# Patient Record
Sex: MALE | Race: BLACK OR AFRICAN AMERICAN | NOT HISPANIC OR LATINO | Employment: FULL TIME | URBAN - METROPOLITAN AREA
[De-identification: names, ages, dates, MRNs, and addresses within clinical notes are randomized per-mention and may not be internally consistent; named-entity substitution may affect disease eponyms.]

---

## 2017-04-19 ENCOUNTER — OFFICE VISIT (OUTPATIENT)
Dept: SURGERY | Facility: CLINIC | Age: 22
End: 2017-04-19
Payer: COMMERCIAL

## 2017-04-19 VITALS — SYSTOLIC BLOOD PRESSURE: 150 MMHG | DIASTOLIC BLOOD PRESSURE: 85 MMHG | HEART RATE: 112 BPM | WEIGHT: 199.75 LBS

## 2017-04-19 DIAGNOSIS — K60.2 ANAL FISSURE: Primary | ICD-10-CM

## 2017-04-19 PROCEDURE — 99204 OFFICE O/P NEW MOD 45 MIN: CPT | Mod: S$GLB,,, | Performed by: COLON & RECTAL SURGERY

## 2017-04-19 PROCEDURE — 1160F RVW MEDS BY RX/DR IN RCRD: CPT | Mod: S$GLB,,, | Performed by: COLON & RECTAL SURGERY

## 2017-04-19 PROCEDURE — 99999 PR PBB SHADOW E&M-NEW PATIENT-LVL III: CPT | Mod: PBBFAC,,, | Performed by: COLON & RECTAL SURGERY

## 2017-04-19 RX ORDER — SODIUM CHLORIDE 9 MG/ML
INJECTION, SOLUTION INTRAVENOUS CONTINUOUS
Status: CANCELLED | OUTPATIENT
Start: 2017-04-19

## 2017-04-19 NOTE — H&P
Latonya Joseph is a 22 y.o. male     Chief Complaint: Anal pain       HPI: Had fissure surgery followed by a hemorrhoidectomy by Dr Franklin in Jan 2017. Since then has had posterior anal pain with BMs. Tried creams and fiber without releaf.    ROS:        Constitutional: No fever, chills, activity or appetite change.      HENT: No hearing loss, facial swelling, neck pain or stiffness.       Eyes: No discharge, itching and visual disturbance.      Respiratory: No apnea, cough, choking or shortness of breath.       Cardiovascular: No leg swelling or chest pain      Gastrointestinal: No abdominal distention or change in bowel habbits     Genitourinary: No dysuria, frequency or flank pain.      Musculoskeletal: No arthralgias or gait problem.      Neurological: No dizziness, seizures or weakness.      Hematological: No adenopathy.      Psychiatric/Behavioral: No hallucinations or behavioral problems.       PE:    APPEARANCE: Well nourished, well developed, in no acute distress.   CHEST: Lungs clear. Normal respiratory effort.  CARDIOVASCULAR: Normal rhythm. No edema.  ABDOMEN: Soft. No tenderness or masses.  Rectum:  Posterior keyhole deformity and fissure, NST no masses, tender  Musculoskeletal: Symmetric, normal range of motion and strength.   Neurological: Alert and oriented to person, place, and time. Normal reflexes.   Skin: Skin is warm and dry.   Psychiatric: Normal mood and affect. Behavior is normal and appropriate.     Impression: Posterior anal fissure  PLAN: discussed options will do EUA,  anal advancement flap.  I have explained the procedure including indications, alternatives, expected outcomes and potential complications. The patient appears to understand and gives informed consent. The patient is medically ready for surgery.

## 2017-04-19 NOTE — PROGRESS NOTES
Patient ID:  Latonya Joseph is a 22 y.o. male     Chief Complaint: Anal pain       HPI: Had fissure surgery followed by a hemorrhoidectomy by Dr Franklin in Jan 2017. Since then has had posterior anal pain with BMs. Tried creams and fiber without releaf.    ROS:        Constitutional: No fever, chills, activity or appetite change.      HENT: No hearing loss, facial swelling, neck pain or stiffness.       Eyes: No discharge, itching and visual disturbance.      Respiratory: No apnea, cough, choking or shortness of breath.       Cardiovascular: No leg swelling or chest pain      Gastrointestinal: No abdominal distention or change in bowel habbits     Genitourinary: No dysuria, frequency or flank pain.      Musculoskeletal: No arthralgias or gait problem.      Neurological: No dizziness, seizures or weakness.      Hematological: No adenopathy.      Psychiatric/Behavioral: No hallucinations or behavioral problems.       PE:    APPEARANCE: Well nourished, well developed, in no acute distress.   CHEST: Lungs clear. Normal respiratory effort.  CARDIOVASCULAR: Normal rhythm. No edema.  ABDOMEN: Soft. No tenderness or masses.  Rectum:  Posterior keyhole deformity and fissure, NST no masses, tender  Musculoskeletal: Symmetric, normal range of motion and strength.   Neurological: Alert and oriented to person, place, and time. Normal reflexes.   Skin: Skin is warm and dry.   Psychiatric: Normal mood and affect. Behavior is normal and appropriate.     Impression: Posterior anal fissure  PLAN: discussed options will do EUA,  anal advancement flap.  I have explained the procedure including indications, alternatives, expected outcomes and potential complications. The patient appears to understand and gives informed consent. The patient is medically ready for surgery.

## 2017-04-21 ENCOUNTER — HOSPITAL ENCOUNTER (OUTPATIENT)
Facility: HOSPITAL | Age: 22
Discharge: HOME | End: 2017-04-21
Attending: COLON & RECTAL SURGERY | Admitting: COLON & RECTAL SURGERY
Payer: COMMERCIAL

## 2017-04-21 ENCOUNTER — ANESTHESIA (OUTPATIENT)
Dept: SURGERY | Facility: HOSPITAL | Age: 22
End: 2017-04-21
Payer: COMMERCIAL

## 2017-04-21 ENCOUNTER — ANESTHESIA EVENT (OUTPATIENT)
Dept: SURGERY | Facility: HOSPITAL | Age: 22
End: 2017-04-21
Payer: COMMERCIAL

## 2017-04-21 VITALS
TEMPERATURE: 98 F | BODY MASS INDEX: 29.47 KG/M2 | RESPIRATION RATE: 20 BRPM | WEIGHT: 199 LBS | OXYGEN SATURATION: 99 % | HEART RATE: 85 BPM | DIASTOLIC BLOOD PRESSURE: 75 MMHG | HEIGHT: 69 IN | SYSTOLIC BLOOD PRESSURE: 138 MMHG

## 2017-04-21 DIAGNOSIS — K60.2 ANAL FISSURE: ICD-10-CM

## 2017-04-21 PROCEDURE — 25000003 PHARM REV CODE 250: Performed by: SURGERY

## 2017-04-21 PROCEDURE — 63600175 PHARM REV CODE 636 W HCPCS: Performed by: COLON & RECTAL SURGERY

## 2017-04-21 PROCEDURE — 46288 REPAIR ANAL FISTULA: CPT | Mod: ,,, | Performed by: COLON & RECTAL SURGERY

## 2017-04-21 PROCEDURE — 25000003 PHARM REV CODE 250: Performed by: NURSE ANESTHETIST, CERTIFIED REGISTERED

## 2017-04-21 PROCEDURE — 25000003 PHARM REV CODE 250: Performed by: COLON & RECTAL SURGERY

## 2017-04-21 PROCEDURE — D9220A PRA ANESTHESIA: Mod: CRNA,,, | Performed by: NURSE ANESTHETIST, CERTIFIED REGISTERED

## 2017-04-21 PROCEDURE — 71000033 HC RECOVERY, INTIAL HOUR: Performed by: COLON & RECTAL SURGERY

## 2017-04-21 PROCEDURE — 36000708 HC OR TIME LEV III 1ST 15 MIN: Performed by: COLON & RECTAL SURGERY

## 2017-04-21 PROCEDURE — 71000015 HC POSTOP RECOV 1ST HR: Performed by: COLON & RECTAL SURGERY

## 2017-04-21 PROCEDURE — 63600175 PHARM REV CODE 636 W HCPCS: Performed by: NURSE ANESTHETIST, CERTIFIED REGISTERED

## 2017-04-21 PROCEDURE — 37000008 HC ANESTHESIA 1ST 15 MINUTES: Performed by: COLON & RECTAL SURGERY

## 2017-04-21 PROCEDURE — 36000709 HC OR TIME LEV III EA ADD 15 MIN: Performed by: COLON & RECTAL SURGERY

## 2017-04-21 PROCEDURE — D9220A PRA ANESTHESIA: Mod: ANES,,, | Performed by: ANESTHESIOLOGY

## 2017-04-21 PROCEDURE — 27000221 HC OXYGEN, UP TO 24 HOURS

## 2017-04-21 PROCEDURE — 37000009 HC ANESTHESIA EA ADD 15 MINS: Performed by: COLON & RECTAL SURGERY

## 2017-04-21 RX ORDER — SUCCINYLCHOLINE CHLORIDE 20 MG/ML
INJECTION INTRAMUSCULAR; INTRAVENOUS
Status: DISCONTINUED | OUTPATIENT
Start: 2017-04-21 | End: 2017-04-21

## 2017-04-21 RX ORDER — SODIUM CHLORIDE 9 MG/ML
INJECTION, SOLUTION INTRAVENOUS CONTINUOUS
Status: DISCONTINUED | OUTPATIENT
Start: 2017-04-21 | End: 2017-04-21 | Stop reason: HOSPADM

## 2017-04-21 RX ORDER — HYDROCODONE BITARTRATE AND ACETAMINOPHEN 5; 325 MG/1; MG/1
1 TABLET ORAL EVERY 4 HOURS PRN
Status: DISCONTINUED | OUTPATIENT
Start: 2017-04-21 | End: 2017-04-21 | Stop reason: HOSPADM

## 2017-04-21 RX ORDER — BUPIVACAINE HYDROCHLORIDE AND EPINEPHRINE 2.5; 5 MG/ML; UG/ML
INJECTION, SOLUTION EPIDURAL; INFILTRATION; INTRACAUDAL; PERINEURAL
Status: DISCONTINUED | OUTPATIENT
Start: 2017-04-21 | End: 2017-04-21 | Stop reason: HOSPADM

## 2017-04-21 RX ORDER — FENTANYL CITRATE 50 UG/ML
INJECTION, SOLUTION INTRAMUSCULAR; INTRAVENOUS
Status: DISCONTINUED | OUTPATIENT
Start: 2017-04-21 | End: 2017-04-21

## 2017-04-21 RX ORDER — IBUPROFEN 200 MG
600 TABLET ORAL EVERY 6 HOURS PRN
Status: DISCONTINUED | OUTPATIENT
Start: 2017-04-21 | End: 2017-04-21 | Stop reason: HOSPADM

## 2017-04-21 RX ORDER — ONDANSETRON 2 MG/ML
INJECTION INTRAMUSCULAR; INTRAVENOUS
Status: DISCONTINUED | OUTPATIENT
Start: 2017-04-21 | End: 2017-04-21

## 2017-04-21 RX ORDER — OXYCODONE HYDROCHLORIDE 15 MG/1
15 TABLET ORAL EVERY 4 HOURS PRN
Qty: 30 TABLET | Refills: 0 | Status: SHIPPED | OUTPATIENT
Start: 2017-04-21 | End: 2018-01-19

## 2017-04-21 RX ORDER — PROPOFOL 10 MG/ML
VIAL (ML) INTRAVENOUS
Status: DISCONTINUED | OUTPATIENT
Start: 2017-04-21 | End: 2017-04-21

## 2017-04-21 RX ORDER — IBUPROFEN 800 MG/1
800 TABLET ORAL EVERY 8 HOURS PRN
Qty: 30 TABLET | Refills: 0 | Status: SHIPPED | OUTPATIENT
Start: 2017-04-21 | End: 2018-01-31

## 2017-04-21 RX ORDER — LIDOCAINE HCL/PF 100 MG/5ML
SYRINGE (ML) INTRAVENOUS
Status: DISCONTINUED | OUTPATIENT
Start: 2017-04-21 | End: 2017-04-21

## 2017-04-21 RX ORDER — MIDAZOLAM HYDROCHLORIDE 1 MG/ML
INJECTION, SOLUTION INTRAMUSCULAR; INTRAVENOUS
Status: DISCONTINUED | OUTPATIENT
Start: 2017-04-21 | End: 2017-04-21

## 2017-04-21 RX ORDER — CEFOXITIN SODIUM 2 G/50ML
2 INJECTION, SOLUTION INTRAVENOUS
Status: COMPLETED | OUTPATIENT
Start: 2017-04-21 | End: 2017-04-21

## 2017-04-21 RX ADMIN — SUCCINYLCHOLINE CHLORIDE 100 MG: 20 INJECTION, SOLUTION INTRAMUSCULAR; INTRAVENOUS at 07:04

## 2017-04-21 RX ADMIN — ONDANSETRON 4 MG: 2 INJECTION INTRAMUSCULAR; INTRAVENOUS at 07:04

## 2017-04-21 RX ADMIN — SODIUM CHLORIDE: 0.9 INJECTION, SOLUTION INTRAVENOUS at 06:04

## 2017-04-21 RX ADMIN — MIDAZOLAM HYDROCHLORIDE 2 MG: 1 INJECTION, SOLUTION INTRAMUSCULAR; INTRAVENOUS at 06:04

## 2017-04-21 RX ADMIN — LIDOCAINE HYDROCHLORIDE 40 MG: 20 INJECTION, SOLUTION INTRAVENOUS at 07:04

## 2017-04-21 RX ADMIN — IBUPROFEN 800 MG: 800 INJECTION INTRAVENOUS at 06:04

## 2017-04-21 RX ADMIN — HYDROCODONE BITARTRATE AND ACETAMINOPHEN 1 TABLET: 5; 325 TABLET ORAL at 08:04

## 2017-04-21 RX ADMIN — CEFOXITIN SODIUM 2 G: 2 INJECTION, SOLUTION INTRAVENOUS at 07:04

## 2017-04-21 RX ADMIN — FENTANYL CITRATE 100 MCG: 50 INJECTION, SOLUTION INTRAMUSCULAR; INTRAVENOUS at 07:04

## 2017-04-21 RX ADMIN — PROPOFOL 180 MG: 10 INJECTION, EMULSION INTRAVENOUS at 07:04

## 2017-04-21 NOTE — ANESTHESIA POSTPROCEDURE EVALUATION
"Anesthesia Post Evaluation    Patient: Latonya Signrios    Procedure(s) Performed: Procedure(s) (LRB):  ADVANCEMENT-FLAP-HOUSE, exam under anesthesia (N/A)    Final Anesthesia Type: general  Patient location during evaluation: PACU  Patient participation: Yes- Able to Participate  Level of consciousness: awake and alert  Post-procedure vital signs: reviewed and stable  Pain management: adequate  Airway patency: patent  PONV status at discharge: No PONV  Anesthetic complications: no      Cardiovascular status: blood pressure returned to baseline  Respiratory status: unassisted  Hydration status: euvolemic  Follow-up not needed.        Visit Vitals    /73    Pulse 97    Temp 35.9 °C (96.6 °F) (Axillary)    Resp 19    Ht 5' 9" (1.753 m)    Wt 90.3 kg (199 lb)    SpO2 100%    BMI 29.39 kg/m2       Pain/Ronny Score: Pain Assessment Performed: Yes (4/21/2017  8:00 AM)  Presence of Pain: denies (4/21/2017  8:00 AM)  Pain Rating Prior to Med Admin: 4 (4/21/2017  8:12 AM)      "

## 2017-04-21 NOTE — OP NOTE
[facility]# [id number]    Jose Sagastume MD  Colon and Rectal Surgery Fellow  [phone]

## 2017-04-21 NOTE — BRIEF OP NOTE
[facility]  Colon and Rectal Surgery  Operative Note    SUMMARY     Date of Procedure: 4/21/2017     Procedure: Procedure(s) (LRB):  ADVANCEMENT-FLAP-HOUSE, exam under anesthesia (N/A)     Surgeon(s) and Role:     * Jonathan Pepper MD - Primary     * Jose Sagastume MD - Fellow    Assisting Surgeon: None    Pre-Operative Diagnosis: Anal fissure [K60.2]    Post-Operative Diagnosis: Post-Op Diagnosis Codes:     * Anal fissure [K60.2], stenosis    Anesthesia: General, marcaine w epi    Technical Procedures Used: posterior House advancement flap  Description of the Findings of the Procedure: post fissure and stenosis    Significant Surgical Tasks Conducted by the Assistant(s), if Applicable: n/a  Complications: No    Estimated Blood Loss (EBL): * No values recorded between 4/21/2017  7:13 AM and 4/21/2017  7:46 AM *           Implants: * No implants in log *    Specimens:   Specimen     None           Condition: Good    Disposition: PACU - hemodynamically stable.    Attestation: I was present and scrubbed for the entire procedure.

## 2017-04-21 NOTE — OP NOTE
DATE OF PROCEDURE:  04/21/2017.    PROCEDURES PERFORMED:  Advancement house flap and an exam under anesthesia.    ATTENDING SURGEON ON RECORD:  Jonathan Pepper M.D.    FELLOW ASSISTING:  Jose Sagastume M.D. (RES).    PREOPERATIVE DIAGNOSIS:  Anal fissure.    POSTOPERATIVE DIAGNOSES:  Anal fissure and anal stenosis.    ANESTHESIA:  The patient received general anesthesia and Marcaine with   epinephrine.    TECHNICAL PROCEDURE PERFORMED:  Posterior house advancement flap.    OPERATIVE FINDINGS:  Posterior midline fissure and mild anal stenosis.    ESTIMATED BLOOD LOSS:  Minimal.    SPECIMENS:  There were no specimens sent to the pathologist.    COMPLICATIONS:  No interoperative complications.    INDICATIONS FOR THE PROCEDURE:  This is a 22-year-old male who had previous   fissure surgeries followed by hemorrhoidectomies in January of this year.  He   continues to have posterior anal pain with bowel movements.  He had tried   multiple nonoperative interventions and desired operative intervention for   relief.    DESCRIPTION OF PROCEDURE:  The patient was met in the preoperative holding area   where his consents and site verification forms were reviewed.  Questions were   answered.  Risks and benefits of the procedure were explained to include   bleeding, infection, damage to surrounding structures, need for repeat   procedures, possible incontinence and risks of anesthesia.  He agreed and wished   to proceed.  He was taken to the Operating Room, placed on the operating room   table in the left lateral position.  After general anesthesia had been induced   and his perineum was prepped and draped in the usual sterile fashion, an   operative timeout was performed and the procedure began.  We initially began by   making an incision in the shape of a house extending radially and just off of   the posterior midline from the fissure that was noted.  This incision was   approximately 1 cm at the base and 3 cm in length out  radially.  The incision   was created with Bovie electrocautery and just through the dermis into the   subcutaneous tissue.  Once we had fully mobilized this flap of tissue, we then   began reapproximation process using 2-0 interrupted sutures of Vicryl sutures   and reapproximated the base to the apex of the fissure, which had been   previously extended using Bovie electrocautery as well.  The base of the house   flap was secured using the interrupted sutures and the lateral aspects of the   house flap were also secured to the mucosa after the mobilization of the flap   with interrupted sutures of 2-0 Vicryl.  We then ran a 3-0 Vicryl suture from   the internal lateral aspect of the flap out to the external aspect of the   incision, reapproximating the mucosal edges and one vertical mattress suture was   placed in the apex of the external aspect of the incision to reapproximate the   mucosal edges.  A 2-0 running Vicryl suture was then used to close the base of   the flap internally and out laterally as well to reapproximate the opposite side   of the house flap.  Dermabond was placed over the incision and the anus easily   allowed a large Hill-Schmitt retractor after the procedure and this concluded   our procedure.  The patient tolerated the procedure well.  He was taken to the   Postanesthesia Care Unit in stable condition.    DICTATED BY:  Jose Sagastume M.D. (RES).      OTTONIEL  dd: 04/21/2017 08:13:51 (CDT)  td: 04/21/2017 10:15:37 (CDT)  Doc ID   #[id number]  Job ID #[id number]    CC:

## 2017-04-21 NOTE — TRANSFER OF CARE
"Anesthesia Transfer of Care Note    Patient: Latonya Signrios    Procedure(s) Performed: Procedure(s) (LRB):  ADVANCEMENT-FLAP-HOUSE, exam under anesthesia (N/A)    Patient location: PACU    Anesthesia Type: general    Transport from OR: Transported from OR on 100% O2 by closed face mask with adequate spontaneous ventilation    Post pain: adequate analgesia    Post assessment: no apparent anesthetic complications and tolerated procedure well    Post vital signs: stable    Level of consciousness: responds to stimulation and sedated    Nausea/Vomiting: no nausea/vomiting    Complications: none          Last vitals:   Visit Vitals    /67    Pulse 82    Temp 35.9 °C (96.6 °F) (Axillary)    Resp 16    Ht 5' 9" (1.753 m)    Wt 90.3 kg (199 lb)    SpO2 100%    BMI 29.39 kg/m2     "

## 2017-04-21 NOTE — INTERVAL H&P NOTE
The patient has been examined and the H&P has been reviewed:    I concur with the findings and no changes have occurred since H&P was written.    Anesthesia/Surgery risks, benefits and alternative options discussed and understood by patient/family.          Active Hospital Problems    Diagnosis  POA    Anal fissure [K60.2]  Yes      Resolved Hospital Problems    Diagnosis Date Resolved POA   No resolved problems to display.

## 2017-04-21 NOTE — DISCHARGE SUMMARY
[facility]  Discharge Summary  Colorectal Surgery      Admit Date: 4/21/2017    Discharge Date and Time:  04/21/2017 7:54 AM     Attending Physician: Jonathan Pepper MD     Discharge Provider: Jose Sagastume    Reason for Admission: Anal fissure repair    Procedures Performed: Procedure(s) (LRB):  ADVANCEMENT-FLAP-HOUSE, exam under anesthesia (N/A)    Hospital Course Pt presented to [facility] for Procedure above.  Pt tolerated the procedure well in its entirety without issue.  For more details, please refer to op note.  Post-op, pt was transferred to PACU.  Pt was started on regular diet and tolerated it well.  Pain was controlled with PO analgesics.  Pt was able to ambulate and urinate without issue.  Pt stable for discharge.       Consults: none    Significant Diagnostic Studies: none    Final Diagnoses:    Principal Problem: <principal problem not specified>   Secondary Diagnoses:   Active Hospital Problems    Diagnosis  POA    Anal fissure [K60.2]  Yes      Resolved Hospital Problems    Diagnosis Date Resolved POA   No resolved problems to display.       Discharged Condition: good    Disposition: Home or Self Care    Follow Up/Patient Instructions:     Medications:  Reconciled Home Medications:   Current Discharge Medication List      START taking these medications    Details   ibuprofen (ADVIL,MOTRIN) 800 MG tablet Take 1 tablet (800 mg total) by mouth every 8 (eight) hours as needed for Pain.  Qty: 30 tablet, Refills: 0      oxycodone (ROXICODONE) 15 MG Tab Take 1 tablet (15 mg total) by mouth every 4 (four) hours as needed for Pain.  Qty: 30 tablet, Refills: 0             Discharge Procedure Orders  Diet general     Activity as tolerated     Call MD for:  temperature >100.4     Call MD for:  persistent nausea and vomiting     Call MD for:  severe uncontrolled pain     Call MD for:  difficulty breathing, headache or visual disturbances     Call MD for:  redness, tenderness, or signs of infection  (pain, swelling, redness, odor or green/yellow discharge around incision site)     Call MD for:  hives     Call MD for:  persistent dizziness or light-headedness     Remove dressing in 24 hours       Follow-up Information     Follow up with Jonathan Pepper MD In 2 weeks.    Specialty:  Colon and Rectal Surgery    Why:  For post op evaluation and care    Contact information:    [address]  [facility] [address]  [phone]            Jose Sagastume MD  Colon and Rectal Surgery Fellow  [phone]

## 2017-04-21 NOTE — ANESTHESIA PREPROCEDURE EVALUATION
04/21/2017  Latonya Joseph is a 22 y.o., male.    Anesthesia Evaluation    I have reviewed the Patient Summary Reports.        Review of Systems  Anesthesia Hx:  No problems with previous Anesthesia   Denies Personal Hx of Anesthesia complications.       Physical Exam  General:  Well nourished    Airway/Jaw/Neck:  Airway Findings: Mouth Opening: Normal Tongue: Normal  General Airway Assessment: Adult  Mallampati: III  Improves to III with phonation.  TM Distance: Normal, at least 6 cm      Dental:  Dental Findings: In tact   Chest/Lungs:  Chest/Lungs Clear    Heart/Vascular:  Heart Findings: Normal            Anesthesia Plan  Type of Anesthesia, risks & benefits discussed:  Anesthesia Type:  general  Patient's Preference:   Intra-op Monitoring Plan:   Intra-op Monitoring Plan Comments:   Post Op Pain Control Plan:   Post Op Pain Control Plan Comments:   Induction:   IV  Beta Blocker:  Patient is not currently on a Beta-Blocker (No further documentation required).       Informed Consent: Patient understands risks and agrees with Anesthesia plan.  Questions answered. Anesthesia consent signed with patient.  ASA Score: 2     Day of Surgery Review of History & Physical:  There are no significant changes.          Ready For Surgery From Anesthesia Perspective.

## 2017-04-21 NOTE — PLAN OF CARE
Home Care Instructions  ANAL/RECTAL SURGERY  ACTIVITY LEVEL:    If you received sedation and/or an anesthetic, you may feel sleepy for several hours. Rest until you feel more  awake. Gradually resume your normal activities.    DIET:    At home, continue with liquids. If there is no nausea, you may eat a soft diet and gradually resume a high fiber  diet. Encourage fluids.    SPECIAL INSTRUCTIONS:    Eat plenty of fruits and vegetables. Drink 8-10 glasses of water or juice every day. Eat whole grain cereals and  breads. Salads with raw vegetables are also a good source of fiber.    DRESSING:    Remove your bandage _________________________________ while soaking. Start taking a sitz bath  _________________. You should soak in clear warm water ___________ times a day and after each bowel  movement. You may use 4 x 4 gauze to the surgical area for any drainage. Wash the area with mild soap  during your regular bath. Use soft, damp tissue or disposable wipes not containing alcohol when wiping after  bowel movements, pat the area and gently dry. Avoid excessive or vigorous wiping. It may help to place soft  tissue or a cotton pad between the buttocks to keep the cheeks  and to absorb any moisture.    MEDICIATIONS:    You will receive instructions for your pain and/or antibiotic prescriptions. Pain medication should be taken  only if needed, and as directed. Antibiotics should be taken as directed until the entire prescription is  completed. If ordered, take stool softeners as directed.    WHEN TO CALL THE DOCTOR:     For any obvious active bleeding   Redness or swelling around the incision   Fever over 101°F (38.4°C)   Drainage (pus) from the wound   Persistent pain not relieved by the pain medication    RETURN APPOINTMENT:  _________________________________________________________________________________________    FOR EMERGENCIES:    If any unusual problems or difficulties occur, contact   ___________________ or the resident at [phone] (page ) or at the Clinic office, [phone].

## 2017-04-21 NOTE — IP AVS SNAPSHOT
[facility]  [address]  [facility] [address]  Phone: [phone]           Patient Discharge Instructions   Our goal is to set you up for success. This packet includes information on your condition, medications, and your home care.  It will help you care for yourself to prevent having to return to the hospital.     Please ask your nurse if you have any questions.      There are many details to remember when preparing to leave the hospital. Here is what you will need to do:    1. Take your medicine. If you are prescribed medications, review your Medication List on the following pages. You may have new medications to  at the pharmacy and others that you'll need to stop taking. Review the instructions for how and when to take your medications. Talk with your doctor or nurses if you are unsure of what to do.     2. Go to your follow-up appointments. Specific follow-up information is listed in the following pages. Your may be contacted by a nurse or clinical provider about future appointments. Be sure we have all of the phone numbers to reach you. Please contact your provider's office if you are unable to make an appointment.     3. Watch for warning signs. Your doctor or nurse will give you detailed warning signs to watch for and when to call for assistance. These instructions may also include educational information about your condition. If you experience any of warning signs to your health, call your doctor.           [facility]  Unless otherwise directed by your provider, please   contact [facility], our nurse care line   that is available for 24/7 assistance.     [phone] (toll-free)     Registered nurses in the [facility]   provide: appointment scheduling, clinical advisement, health education, and other advisory services.                  ** Verify the list of medication(s) below is accurate and up to date. Carry this with you in case of  emergency. If your medications have changed, please notify your healthcare provider.             Medication List      START taking these medications        Additional Info                      ibuprofen 800 MG tablet   Commonly known as:  ADVIL,MOTRIN   Quantity:  30 tablet   Refills:  0   Dose:  800 mg    Instructions:  Take 1 tablet (800 mg total) by mouth every 8 (eight) hours as needed for Pain.     Begin Date    AM    Noon    PM    Bedtime       oxycodone 15 MG Tab   Commonly known as:  ROXICODONE   Quantity:  30 tablet   Refills:  0   Dose:  15 mg    Instructions:  Take 1 tablet (15 mg total) by mouth every 4 (four) hours as needed for Pain.     Begin Date    AM    Noon    PM    Bedtime            Where to Get Your Medications      You can get these medications from any pharmacy     Bring a paper prescription for each of these medications     ibuprofen 800 MG tablet    oxycodone 15 MG Tab                  Please bring to all follow up appointments:    1. A copy of your discharge instructions.  2. All medicines you are currently taking in their original bottles.  3. Identification and insurance card.    Please arrive 15 minutes ahead of scheduled appointment time.    Please call 24 hours in advance if you must reschedule your appointment and/or time.        Follow-up Information     Follow up with Jonathan Pepper MD In 2 weeks.    Specialty:  Colon and Rectal Surgery    Why:  For post op evaluation and care    Contact information:    [address] [facility] [id number] [phone]          Discharge Instructions     Future Orders    Activity as tolerated     Call MD for:  difficulty breathing, headache or visual disturbances     Call MD for:  hives     Call MD for:  persistent dizziness or light-headedness     Call MD for:  persistent nausea and vomiting     Call MD for:  redness, tenderness, or signs of infection (pain, swelling, redness, odor or green/yellow discharge around incision site)     Call MD  for:  severe uncontrolled pain     Call MD for:  temperature >100.4     Diet general     Questions:    Total calories:      Fat restriction, if any:      Protein restriction, if any:      Na restriction, if any:      Fluid restriction:      Additional restrictions:      Remove dressing in 24 hours         Discharge Instructions                         Home Care Instructions  ANAL/RECTAL SURGERY  ACTIVITY LEVEL:     If you received sedation and/or an anesthetic, you may feel sleepy for several hours. Rest until you feel more  awake. Gradually resume your normal activities.     DIET:     At home, continue with liquids. If there is no nausea, you may eat a soft diet and gradually resume a high fiber  diet. Encourage fluids.     SPECIAL INSTRUCTIONS:     Eat plenty of fruits and vegetables. Drink 8-10 glasses of water or juice every day. Eat whole grain cereals and  breads. Salads with raw vegetables are also a good source of fiber.     DRESSING:     Remove your bandage _________________________________ while soaking. Start taking a sitz bath  _________________. You should soak in clear warm water ___________ times a day and after each bowel  movement. You may use 4 x 4 gauze to the surgical area for any drainage. Wash the area with mild soap  during your regular bath. Use soft, damp tissue or disposable wipes not containing alcohol when wiping after  bowel movements, pat the area and gently dry. Avoid excessive or vigorous wiping. It may help to place soft  tissue or a cotton pad between the buttocks to keep the cheeks  and to absorb any moisture.     MEDICIATIONS:     You will receive instructions for your pain and/or antibiotic prescriptions. Pain medication should be taken  only if needed, and as directed. Antibiotics should be taken as directed until the entire prescription is  completed. If ordered, take stool softeners as directed.     WHEN TO CALL THE DOCTOR:      For any obvious active bleeding    "Redness or swelling around the incision   Fever over 101°F (38.4°C)   Drainage (pus) from the wound   Persistent pain not relieved by the pain medication     RETURN APPOINTMENT:  _________________________________________________________________________________________     FOR EMERGENCIES:     If any unusual problems or difficulties occur, contact  ___________________ or the resident at [phone] (page ) or at the Clinic office, [phone].                               Admission Information     Date & Time Provider Department CSN    4/21/2017  5:22 AM Jonathan Pepper MD [facility] [id number]      Care Providers     Provider Role Specialty Primary office phone    Jonathan Pepper MD Attending Provider Colon and Rectal Surgery [phone]    Jonathan Pepper MD Surgeon  Colon and Rectal Surgery [phone]      Your Vitals Were     BP Pulse Temp Resp Height Weight    137/73 97 96.6 °F (35.9 °C) (Axillary) 19 5' 9" (1.753 m) 90.3 kg (199 lb)    SpO2 BMI             100% 29.39 kg/m2         Recent Lab Values     No lab values to display.      Allergies as of 4/21/2017     No Known Allergies      Advance Directives     An advance directive is a document which, in the event you are no longer able to make decisions for yourself, tells your healthcare team what kind of treatment you do or do not want to receive, or who you would like to make those decisions for you.  If you do not currently have an advance directive, [facility] encourages you to create one.  For more information call:  [phone] ([phone]),  or log on to [email or web address].        Smoking Cessation     If you would like to quit smoking:   You may be eligible for free services if you are a [place] resident and started smoking cigarettes before September 1, 1988.  Call the [employer]oking [employer]essation [employer] ([employer]) toll free at (612) 522-9301 or ([phone] 409-3620.   Call 4-590-QUIT-NOW if you do " not meet the above criteria.   Contact us via email: [email or web address]   View our website for more information: [email or web address]        Language Assistance Services     ATTENTION: Language assistance services are available, free of charge. Please call [phone].      ATENCIÓN: Si habla ankit, tiene a callahan disposición servicios gratuitos de asistencia lingüística. Llame al [phone].     CHÚ Ý: N?u b?n nói Ti?ng Vi?t, có các d?ch v? h? tr? ngôn ng? mi?n phí dành cho b?n. G?i s? [phone].         [facility] complies with applicable Federal civil rights laws and does not discriminate on the basis of race, color, national origin, age, disability, or sex.

## 2017-04-21 NOTE — DISCHARGE INSTRUCTIONS
Home Care Instructions  ANAL/RECTAL SURGERY  ACTIVITY LEVEL:     If you received sedation and/or an anesthetic, you may feel sleepy for several hours. Rest until you feel more  awake. Gradually resume your normal activities.     DIET:     At home, continue with liquids. If there is no nausea, you may eat a soft diet and gradually resume a high fiber  diet. Encourage fluids.     SPECIAL INSTRUCTIONS:     Eat plenty of fruits and vegetables. Drink 8-10 glasses of water or juice every day. Eat whole grain cereals and  breads. Salads with raw vegetables are also a good source of fiber.     DRESSING:     Remove your bandage _________________________________ while soaking. Start taking a sitz bath  _________________. You should soak in clear warm water ___________ times a day and after each bowel  movement. You may use 4 x 4 gauze to the surgical area for any drainage. Wash the area with mild soap  during your regular bath. Use soft, damp tissue or disposable wipes not containing alcohol when wiping after  bowel movements, pat the area and gently dry. Avoid excessive or vigorous wiping. It may help to place soft  tissue or a cotton pad between the buttocks to keep the cheeks  and to absorb any moisture.     MEDICIATIONS:     You will receive instructions for your pain and/or antibiotic prescriptions. Pain medication should be taken  only if needed, and as directed. Antibiotics should be taken as directed until the entire prescription is  completed. If ordered, take stool softeners as directed.     WHEN TO CALL THE DOCTOR:      For any obvious active bleeding   Redness or swelling around the incision   Fever over 101°F (38.4°C)   Drainage (pus) from the wound   Persistent pain not relieved by the pain medication     RETURN APPOINTMENT:  _________________________________________________________________________________________     FOR EMERGENCIES:     If any unusual problems or difficulties  occur, contact  ___________________ or the resident at [phone] (page ) or at the Clinic office, [phone].

## 2017-04-21 NOTE — ANESTHESIA RELEASE NOTE
"Anesthesia Release from PACU Note    Patient: Latonya Signrios    Procedure(s) Performed: Procedure(s) (LRB):  ADVANCEMENT-FLAP-HOUSE, exam under anesthesia (N/A)    Anesthesia type: general    Post pain: Adequate analgesia    Post assessment: no apparent anesthetic complications    Last Vitals:   Visit Vitals    /73    Pulse 97    Temp 35.9 °C (96.6 °F) (Axillary)    Resp 19    Ht 5' 9" (1.753 m)    Wt 90.3 kg (199 lb)    SpO2 100%    BMI 29.39 kg/m2       Post vital signs: stable    Level of consciousness: awake    Nausea/Vomiting: no nausea/no vomiting    Complications: none    Airway Patency: patent    Respiratory: unassisted    Cardiovascular: stable and blood pressure at baseline    Hydration: euvolemic  "

## 2017-04-21 NOTE — PLAN OF CARE
Pt tolerated procedure/anesthesia well, vss, no distress noted or reported, denies pain, tolerated PO without difficulties, able to void without difficulties, discharge instructions reviewed with pt and family, verbalized understanding, consents with chart.

## 2017-04-26 RX ORDER — HYDROCODONE BITARTRATE AND ACETAMINOPHEN 10; 325 MG/1; MG/1
1 TABLET ORAL EVERY 4 HOURS PRN
Qty: 60 TABLET | Refills: 0 | Status: SHIPPED | OUTPATIENT
Start: 2017-04-26 | End: 2018-01-31

## 2017-05-03 ENCOUNTER — TELEPHONE (OUTPATIENT)
Dept: SURGERY | Facility: CLINIC | Age: 22
End: 2017-05-03

## 2017-05-03 NOTE — TELEPHONE ENCOUNTER
----- Message from Zahraa Ma sent at 5/3/2017 12:38 PM CDT -----  Contact: pt#[phone]  Pt states that he has an appt for today 2:50 but it's not on the schedule . Please call

## 2017-05-24 ENCOUNTER — OFFICE VISIT (OUTPATIENT)
Dept: SURGERY | Facility: CLINIC | Age: 22
End: 2017-05-24
Payer: COMMERCIAL

## 2017-05-24 VITALS
DIASTOLIC BLOOD PRESSURE: 84 MMHG | WEIGHT: 204.56 LBS | BODY MASS INDEX: 30.21 KG/M2 | HEART RATE: 63 BPM | SYSTOLIC BLOOD PRESSURE: 143 MMHG

## 2017-05-24 DIAGNOSIS — K60.2 ANAL FISSURE: Primary | ICD-10-CM

## 2017-05-24 PROCEDURE — 99999 PR PBB SHADOW E&M-EST. PATIENT-LVL III: CPT | Mod: PBBFAC,,, | Performed by: COLON & RECTAL SURGERY

## 2017-05-24 PROCEDURE — 99024 POSTOP FOLLOW-UP VISIT: CPT | Mod: S$GLB,,, | Performed by: COLON & RECTAL SURGERY

## 2017-05-24 NOTE — PROGRESS NOTES
Patient ID:  Latonya Joseph is a 22 y.o. male     Chief Complaint: Anal pain       HPI: 4/21/17 Anal advancement flap for fissure. Doing better. Less pain some drainage    Had fissure surgery followed by a hemorrhoidectomy by Dr Franklin in Jan 2017. Since then has had posterior anal pain with BMs. Tried creams and fiber without releaf.    ROS:        Constitutional: No fever, chills, activity or appetite change.      HENT: No hearing loss, facial swelling, neck pain or stiffness.       Eyes: No discharge, itching and visual disturbance.      Respiratory: No apnea, cough, choking or shortness of breath.       Cardiovascular: No leg swelling or chest pain      Gastrointestinal: No abdominal distention or change in bowel habbits     Genitourinary: No dysuria, frequency or flank pain.      Musculoskeletal: No arthralgias or gait problem.      Neurological: No dizziness, seizures or weakness.      Hematological: No adenopathy.      Psychiatric/Behavioral: No hallucinations or behavioral problems.       PE:    APPEARANCE: Well nourished, well developed, in no acute distress.   CHEST: Lungs clear. Normal respiratory effort.  CARDIOVASCULAR: Normal rhythm. No edema.  ABDOMEN: Soft. No tenderness or masses.  Rectum:  Intact flao small amountg of drainage, no induretaion  Musculoskeletal: Symmetric, normal range of motion and strength.   Neurological: Alert and oriented to person, place, and time. Normal reflexes.   Skin: Skin is warm and dry.   Psychiatric: Normal mood and affect. Behavior is normal and appropriate.     Impression: s/p anal advancement flap  PLAN: wound care, RTC 1 month

## 2017-06-07 ENCOUNTER — OFFICE VISIT (OUTPATIENT)
Dept: SURGERY | Facility: CLINIC | Age: 22
End: 2017-06-07
Payer: COMMERCIAL

## 2017-06-07 VITALS
BODY MASS INDEX: 30.7 KG/M2 | WEIGHT: 207.25 LBS | SYSTOLIC BLOOD PRESSURE: 137 MMHG | HEIGHT: 69 IN | DIASTOLIC BLOOD PRESSURE: 64 MMHG | HEART RATE: 66 BPM

## 2017-06-07 DIAGNOSIS — K60.2 ANAL FISSURE: Primary | ICD-10-CM

## 2017-06-07 PROCEDURE — 99024 POSTOP FOLLOW-UP VISIT: CPT | Mod: S$GLB,,, | Performed by: COLON & RECTAL SURGERY

## 2017-06-07 PROCEDURE — 99999 PR PBB SHADOW E&M-EST. PATIENT-LVL III: CPT | Mod: PBBFAC,,, | Performed by: COLON & RECTAL SURGERY

## 2017-06-07 NOTE — PROGRESS NOTES
Patient ID:  Latonya Joseph is a 22 y.o. male     Chief Complaint: Anal pain       HPI: 4/21/17 Anal advancement flap for fissure. Doing better. Minimal pain and drainage. Stools not consistantly soft.    Had fissure surgery followed by a hemorrhoidectomy by Dr Franklin in Jan 2017. Since then has had posterior anal pain with BMs. Tried creams and fiber without releaf.    ROS:        Constitutional: No fever, chills, activity or appetite change.      HENT: No hearing loss, facial swelling, neck pain or stiffness.       Eyes: No discharge, itching and visual disturbance.      Respiratory: No apnea, cough, choking or shortness of breath.       Cardiovascular: No leg swelling or chest pain      Gastrointestinal: No abdominal distention or change in bowel habbits     Genitourinary: No dysuria, frequency or flank pain.      Musculoskeletal: No arthralgias or gait problem.      Neurological: No dizziness, seizures or weakness.      Hematological: No adenopathy.      Psychiatric/Behavioral: No hallucinations or behavioral problems.       PE:    APPEARANCE: Well nourished, well developed, in no acute distress.   CHEST: Lungs clear. Normal respiratory effort.  CARDIOVASCULAR: Normal rhythm. No edema.  ABDOMEN: Soft. No tenderness or masses.  Rectum:  IHEaled flap, no induration  Musculoskeletal: Symmetric, normal range of motion and strength.   Neurological: Alert and oriented to person, place, and time. Normal reflexes.   Skin: Skin is warm and dry.   Psychiatric: Normal mood and affect. Behavior is normal and appropriate.     Impression: s/p anal advancement flap  PLAN: Add Mitrlax to fiber, RTC PRN.

## 2017-10-25 ENCOUNTER — OFFICE VISIT (OUTPATIENT)
Dept: SURGERY | Facility: CLINIC | Age: 22
End: 2017-10-25
Payer: COMMERCIAL

## 2017-10-25 VITALS
WEIGHT: 198.44 LBS | HEIGHT: 69 IN | SYSTOLIC BLOOD PRESSURE: 143 MMHG | HEART RATE: 83 BPM | DIASTOLIC BLOOD PRESSURE: 69 MMHG | BODY MASS INDEX: 29.39 KG/M2

## 2017-10-25 DIAGNOSIS — K62.89 ANAL PAIN: ICD-10-CM

## 2017-10-25 PROCEDURE — 99213 OFFICE O/P EST LOW 20 MIN: CPT | Mod: S$GLB,,, | Performed by: COLON & RECTAL SURGERY

## 2017-10-25 PROCEDURE — 99999 PR PBB SHADOW E&M-EST. PATIENT-LVL III: CPT | Mod: PBBFAC,,, | Performed by: COLON & RECTAL SURGERY

## 2017-10-25 NOTE — PATIENT INSTRUCTIONS
Fiber on a daily basis 2 capsules in morning and evening (Metamuci, citrucil or Gummy Fiber) by mouth    Miralax 1/2 to 1 capful in water daily    Lidocaine to anus  Before and after BMs.

## 2017-10-25 NOTE — PROGRESS NOTES
Patient ID:  Latonya Joseph is a 22 y.o. male     Chief Complaint: Anal pain       HPI: 4/21/17 Anal advancement flap for fissure. Feels that still has pain with BMs. Burning pain. Stools variable.    Had fissure surgery followed by a hemorrhoidectomy by Dr Franklin in Jan 2017. Since then has had posterior anal pain with BMs. Tried creams and fiber without releaf.    ROS:        Constitutional: No fever, chills, activity or appetite change.      HENT: No hearing loss, facial swelling, neck pain or stiffness.       Eyes: No discharge, itching and visual disturbance.      Respiratory: No apnea, cough, choking or shortness of breath.       Cardiovascular: No leg swelling or chest pain      Gastrointestinal: No abdominal distention or change in bowel habbits     Genitourinary: No dysuria, frequency or flank pain.      Musculoskeletal: No arthralgias or gait problem.      Neurological: No dizziness, seizures or weakness.      Hematological: No adenopathy.      Psychiatric/Behavioral: No hallucinations or behavioral problems.       PE:    APPEARANCE: Well nourished, well developed, in no acute distress.   CHEST: Lungs clear. Normal respiratory effort.  CARDIOVASCULAR: Normal rhythm. No edema.  ABDOMEN: Soft. No tenderness or masses.  Rectum:  Healed flap, no induration, mild posterior pain on digital. Increased sphincter tone  Musculoskeletal: Symmetric, normal range of motion and strength.   Neurological: Alert and oriented to person, place, and time. Normal reflexes.   Skin: Skin is warm and dry.   Psychiatric: Normal mood and affect. Behavior is normal and appropriate.     Impression: s/p anal advancement flap  PLAN: Mitralax, increae fiber and lidocaine cream. RTC 1 month

## 2018-01-15 ENCOUNTER — PATIENT MESSAGE (OUTPATIENT)
Dept: SURGERY | Facility: CLINIC | Age: 23
End: 2018-01-15

## 2018-01-18 ENCOUNTER — PATIENT MESSAGE (OUTPATIENT)
Dept: SURGERY | Facility: CLINIC | Age: 23
End: 2018-01-18

## 2018-01-19 ENCOUNTER — OFFICE VISIT (OUTPATIENT)
Dept: SURGERY | Facility: CLINIC | Age: 23
End: 2018-01-19
Payer: COMMERCIAL

## 2018-01-19 VITALS
DIASTOLIC BLOOD PRESSURE: 63 MMHG | BODY MASS INDEX: 30.92 KG/M2 | HEIGHT: 69 IN | SYSTOLIC BLOOD PRESSURE: 155 MMHG | HEART RATE: 110 BPM | WEIGHT: 208.75 LBS

## 2018-01-19 DIAGNOSIS — K62.89 ANAL PAIN: Primary | ICD-10-CM

## 2018-01-19 PROCEDURE — 99213 OFFICE O/P EST LOW 20 MIN: CPT | Mod: S$GLB,,, | Performed by: NURSE PRACTITIONER

## 2018-01-19 PROCEDURE — 99999 PR PBB SHADOW E&M-EST. PATIENT-LVL III: CPT | Mod: PBBFAC,,, | Performed by: NURSE PRACTITIONER

## 2018-01-19 RX ORDER — LIDOCAINE 50 MG/G
OINTMENT TOPICAL
Qty: 30 G | Refills: 0 | Status: SHIPPED | OUTPATIENT
Start: 2018-01-19 | End: 2018-01-31

## 2018-01-19 RX ORDER — AMOXICILLIN AND CLAVULANATE POTASSIUM 400; 57 MG/1; MG/1
1 TABLET, CHEWABLE ORAL 2 TIMES DAILY
Qty: 14 TABLET | Refills: 0 | Status: SHIPPED | OUTPATIENT
Start: 2018-01-19 | End: 2018-01-26

## 2018-01-19 NOTE — PROGRESS NOTES
"Subjective:       Patient ID: Latonya Joseph is a 22 y.o. male.    Chief Complaint: Hemorrhoids (pt states rectal bleeding and pain. Pain level is 11)    HPI   22 M patient of Dr Alamo. Underwent EUA and flap advancement for fissure April 2017. Reports severe rectal pain X 1 week, using hydrocortisone cream, no relief. Having purulent bloody drainage. symptomatic fevers, afebrile in clinic. Taking fiber and miralax, having soft formed bowel movements. Reports feels like had an "abscess" near flap site that has started to drain.       Review of Systems   Constitutional: Negative for appetite change, chills, fatigue, fever and unexpected weight change.   Respiratory: Negative for shortness of breath.    Cardiovascular: Negative for chest pain.   Gastrointestinal: Positive for rectal pain. Negative for abdominal distention, abdominal pain, anal bleeding, blood in stool, constipation, diarrhea, nausea and vomiting.       Objective:      Physical Exam   Constitutional: He is oriented to person, place, and time. He appears well-developed and well-nourished.   Eyes: Conjunctivae and EOM are normal.   Pulmonary/Chest: Effort normal. No respiratory distress.   Abdominal: Soft. He exhibits no distension. There is no tenderness.   Genitourinary: Rectal exam shows no mass and no tenderness.   Genitourinary Comments: Flap site intact, appears healthy. Minimal purulent drainage. Tenderness posteriorly    Musculoskeletal: Normal range of motion.   Neurological: He is alert and oriented to person, place, and time.   Skin: Skin is warm and dry.   Psychiatric: He has a normal mood and affect. His behavior is normal.       Assessment:       1. Anal pain        Plan:       Augmentin X 7 days  Lidocaine ointment  Stop hydrocortisone  F/U with Dr Pepper   "

## 2018-01-23 RX ORDER — AMOXICILLIN AND CLAVULANATE POTASSIUM 500; 125 MG/1; MG/1
1 TABLET, FILM COATED ORAL 2 TIMES DAILY
Qty: 14 TABLET | Refills: 0 | Status: SHIPPED | OUTPATIENT
Start: 2018-01-23 | End: 2018-01-31

## 2018-01-31 ENCOUNTER — OFFICE VISIT (OUTPATIENT)
Dept: SURGERY | Facility: CLINIC | Age: 23
End: 2018-01-31
Payer: COMMERCIAL

## 2018-01-31 VITALS
HEIGHT: 69 IN | BODY MASS INDEX: 30.59 KG/M2 | SYSTOLIC BLOOD PRESSURE: 156 MMHG | DIASTOLIC BLOOD PRESSURE: 83 MMHG | WEIGHT: 206.56 LBS | HEART RATE: 57 BPM

## 2018-01-31 DIAGNOSIS — K62.89 ANAL PAIN: Primary | ICD-10-CM

## 2018-01-31 PROCEDURE — 99024 POSTOP FOLLOW-UP VISIT: CPT | Mod: S$GLB,,, | Performed by: COLON & RECTAL SURGERY

## 2018-01-31 PROCEDURE — 99999 PR PBB SHADOW E&M-EST. PATIENT-LVL III: CPT | Mod: PBBFAC,,, | Performed by: COLON & RECTAL SURGERY

## 2018-01-31 NOTE — PROGRESS NOTES
Patient ID:  Latonya Joseph is a 22 y.o. male     Chief Complaint: Anal pain       HPI: 4/21/17 Anal advancement flap for fissure. Has an abscess after going to [employer]. RX with antibiotics. Better now no pain meds. On Miralax    Had fissure surgery followed by a hemorrhoidectomy by Dr Franklin in Jan 2017. Since then has had posterior anal pain with BMs. Tried creams and fiber without releaf.    ROS:        Constitutional: No fever, chills, activity or appetite change.      HENT: No hearing loss, facial swelling, neck pain or stiffness.       Eyes: No discharge, itching and visual disturbance.      Respiratory: No apnea, cough, choking or shortness of breath.       Cardiovascular: No leg swelling or chest pain      Gastrointestinal: No abdominal distention or change in bowel habbits     Genitourinary: No dysuria, frequency or flank pain.      Musculoskeletal: No arthralgias or gait problem.      Neurological: No dizziness, seizures or weakness.      Hematological: No adenopathy.      Psychiatric/Behavioral: No hallucinations or behavioral problems.       PE:    APPEARANCE: Well nourished, well developed, in no acute distress.   CHEST: Lungs clear. Normal respiratory effort.  CARDIOVASCULAR: Normal rhythm. No edema.  ABDOMEN: Soft. No tenderness or masses.  Rectum:  Healed flap, no induration,  2 verysmall tags      Musculoskeletal: Symmetric, normal range of motion and strength.   Neurological: Alert and oriented to person, place, and time. Normal reflexes.   Skin: Skin is warm and dry.   Psychiatric: Normal mood and affect. Behavior is normal and appropriate.     Impression: s/p anal advancement flap  PLAN: Continue Mitralax,   RTC 1 month

## 2018-02-28 ENCOUNTER — OFFICE VISIT (OUTPATIENT)
Dept: SURGERY | Facility: CLINIC | Age: 23
End: 2018-02-28
Payer: COMMERCIAL

## 2018-02-28 VITALS
HEIGHT: 69 IN | WEIGHT: 202.81 LBS | DIASTOLIC BLOOD PRESSURE: 75 MMHG | SYSTOLIC BLOOD PRESSURE: 149 MMHG | BODY MASS INDEX: 30.04 KG/M2 | HEART RATE: 58 BPM

## 2018-02-28 DIAGNOSIS — K60.2 ANAL FISSURE: Primary | ICD-10-CM

## 2018-02-28 PROCEDURE — 99024 POSTOP FOLLOW-UP VISIT: CPT | Mod: S$GLB,,, | Performed by: COLON & RECTAL SURGERY

## 2018-02-28 PROCEDURE — 99999 PR PBB SHADOW E&M-EST. PATIENT-LVL III: CPT | Mod: PBBFAC,,, | Performed by: COLON & RECTAL SURGERY

## 2018-02-28 NOTE — PROGRESS NOTES
Patient ID:  Latonya Joseph is a 23 y.o. male     Chief Complaint: Anal pain       HPI: Dpoing better n Miralax and fiber. Minimal pain    4/21/17 Anal advancement flap for fissure. Has an abscess after going to [employer]. RX with antibiotics. Better now no pain meds. On Miralax    Had fissure surgery followed by a hemorrhoidectomy by Dr Franklin in Jan 2017. Since then has had posterior anal pain with BMs. Tried creams and fiber without releaf.    ROS:        Constitutional: No fever, chills, activity or appetite change.      HENT: No hearing loss, facial swelling, neck pain or stiffness.       Eyes: No discharge, itching and visual disturbance.      Respiratory: No apnea, cough, choking or shortness of breath.       Cardiovascular: No leg swelling or chest pain      Gastrointestinal: No abdominal distention or change in bowel habbits     Genitourinary: No dysuria, frequency or flank pain.      Musculoskeletal: No arthralgias or gait problem.      Neurological: No dizziness, seizures or weakness.      Hematological: No adenopathy.      Psychiatric/Behavioral: No hallucinations or behavioral problems.       PE:    APPEARANCE: Well nourished, well developed, in no acute distress.   CHEST: Lungs clear. Normal respiratory effort.  CARDIOVASCULAR: Normal rhythm. No edema.  ABDOMEN: Soft. No tenderness or masses.  Rectum:  Healed flap, no induration,  2 verysmall tags      Musculoskeletal: Symmetric, normal range of motion and strength.   Neurological: Alert and oriented to person, place, and time. Normal reflexes.   Skin: Skin is warm and dry.   Psychiatric: Normal mood and affect. Behavior is normal and appropriate.     Impression: s/p anal advancement flap  PLAN: Continue Mitralax and foanusha,   RTC PRN

## 2018-03-21 ENCOUNTER — PATIENT MESSAGE (OUTPATIENT)
Dept: SURGERY | Facility: CLINIC | Age: 23
End: 2018-03-21

## 2018-04-11 ENCOUNTER — OFFICE VISIT (OUTPATIENT)
Dept: SURGERY | Facility: CLINIC | Age: 23
End: 2018-04-11
Payer: COMMERCIAL

## 2018-04-11 VITALS
DIASTOLIC BLOOD PRESSURE: 75 MMHG | WEIGHT: 199.75 LBS | HEART RATE: 59 BPM | BODY MASS INDEX: 29.59 KG/M2 | SYSTOLIC BLOOD PRESSURE: 151 MMHG | HEIGHT: 69 IN

## 2018-04-11 DIAGNOSIS — K60.2 ANAL FISSURE: Primary | ICD-10-CM

## 2018-04-11 PROCEDURE — 99213 OFFICE O/P EST LOW 20 MIN: CPT | Mod: S$GLB,,, | Performed by: COLON & RECTAL SURGERY

## 2018-04-11 PROCEDURE — 99999 PR PBB SHADOW E&M-EST. PATIENT-LVL III: CPT | Mod: PBBFAC,,, | Performed by: COLON & RECTAL SURGERY

## 2018-04-11 NOTE — PROGRESS NOTES
Patient ID:  Latonya Joseph is a 23 y.o. male     Chief Complaint: Anal pain       HPI: Was doing well but backed off Miralax and had a hard BM associated with pain and bleeding    Dpoing better n Miralax and fiber. Minimal pain    4/21/17 Anal advancement flap for fissure. Has an abscess after going to [employer]. RX with antibiotics. Better now no pain meds. On Miralax    Had fissure surgery followed by a hemorrhoidectomy by Dr Franklin in Jan 2017. Since then has had posterior anal pain with BMs. Tried creams and fiber without releaf.    ROS:        Constitutional: No fever, chills, activity or appetite change.      HENT: No hearing loss, facial swelling, neck pain or stiffness.       Eyes: No discharge, itching and visual disturbance.      Respiratory: No apnea, cough, choking or shortness of breath.       Cardiovascular: No leg swelling or chest pain      Gastrointestinal: No abdominal distention or change in bowel habbits     Genitourinary: No dysuria, frequency or flank pain.      Musculoskeletal: No arthralgias or gait problem.      Neurological: No dizziness, seizures or weakness.      Hematological: No adenopathy.      Psychiatric/Behavioral: No hallucinations or behavioral problems.       PE:    APPEARANCE: Well nourished, well developed, in no acute distress.   CHEST: Lungs clear. Normal respiratory effort.  CARDIOVASCULAR: Normal rhythm. No edema.  ABDOMEN: Soft. No tenderness or masses.  Rectum:  Healed flap, no induration,  Small healing ficcure    Musculoskeletal: Symmetric, normal range of motion and strength.   Neurological: Alert and oriented to person, place, and time. Normal reflexes.   Skin: Skin is warm and dry.   Psychiatric: Normal mood and affect. Behavior is normal and appropriate.     Impression: s/p anal advancement flap  PLAN: Continue Mitralax and fiber,  Lidocaine cream,  RTC PRN

## 2018-08-22 ENCOUNTER — OFFICE VISIT (OUTPATIENT)
Dept: SURGERY | Facility: CLINIC | Age: 23
End: 2018-08-22
Payer: COMMERCIAL

## 2018-08-22 VITALS
WEIGHT: 201.25 LBS | HEART RATE: 80 BPM | BODY MASS INDEX: 29.72 KG/M2 | SYSTOLIC BLOOD PRESSURE: 140 MMHG | DIASTOLIC BLOOD PRESSURE: 77 MMHG

## 2018-08-22 DIAGNOSIS — K64.8 INTERNAL PROLAPSED HEMORRHOIDS: ICD-10-CM

## 2018-08-22 PROCEDURE — 99213 OFFICE O/P EST LOW 20 MIN: CPT | Mod: 25,S$GLB,, | Performed by: COLON & RECTAL SURGERY

## 2018-08-22 PROCEDURE — 3008F BODY MASS INDEX DOCD: CPT | Mod: CPTII,S$GLB,, | Performed by: COLON & RECTAL SURGERY

## 2018-08-22 PROCEDURE — 99999 PR PBB SHADOW E&M-EST. PATIENT-LVL III: CPT | Mod: PBBFAC,,, | Performed by: COLON & RECTAL SURGERY

## 2018-08-22 PROCEDURE — 46221 LIGATION OF HEMORRHOID(S): CPT | Mod: S$GLB,,, | Performed by: COLON & RECTAL SURGERY

## 2018-08-22 NOTE — PROGRESS NOTES
History & Physical    SUBJECTIVE:     Chief Complaint: rectal bleeding     History of Present Illness:  Patient is a 23 y.o. male who presents for further evaluation of rectal bleeding with bowel movements followed by spasms of pain x months.He reports noting a mass at the 12 o clock position with teaspoons of bright red blood with bowel movements. He reports no straining and a different sensation from a previous hemorrhoid from years ago. He has been having regular bowel movements with the assistance of fiber, psyllium, and a capsule of miralax daily. Additionally feels as though that the skin around the further flap area is thinned.       4/21/17 Anal advancement flap for fissure. Has an abscess after going to Gym. RX with antibiotics. Better now no pain meds. On Miralax     Had fissure surgery followed by a hemorrhoidectomy by Dr Franklin in Jan 2017. Since then has had posterior anal pain with BMs. Tried creams and fiber without releaf.      Review of patient's allergies indicates:  No Known Allergies    No past medical history on file.  No past surgical history on file.  No family history on file.  Social History     Tobacco Use    Smoking status: Never Smoker    Smokeless tobacco: Never Used   Substance Use Topics    Alcohol use: Not on file    Drug use: Not on file        Review of Systems:  Constitutional: no fever or chills  Eyes: no visual changes  Respiratory: no cough or shortness of breath  Cardiovascular: no chest pain or palpitations  Gastrointestinal: no nausea or vomiting, tolerating diet, (+) BRBPR   Musculoskeletal: no arthralgias or myalgias    OBJECTIVE:     Vital Signs (Most Recent)  Pulse: 80 (08/22/18 1452)  BP: (!) 140/77 (08/22/18 1452)    Physical Exam:  General: Black or  male in NAD sitting in chair in clinic  Neuro: aaox4 maex4 perrl  Respiratory: resps even unlabored while on room air   Cardiac: cap refill <2 sec  Abdomen: Normal, benign.  Extremities: Warm dry and  intact  Anorectal: multiple hermorrhoids visualized with anoscope along with a skin tag, previous fissure flap healing appropriately     Procedure:  Hemorrhoid removal -  Anoscope placed with betadine swab x 1,  3 cc 0.5 % lidocaine injected with hemorrhoid in the right upper posterior region rubber band ligated after blunt excision Patient tolerated the procedure well with minimal blood loss.     Laboratory  None     Diagnostic Results:  None     ASSESSMENT/PLAN:     Mr. Joseph is a 23 year old male w/ previous fissure surgery and posterior anal advancement flap presenting with bleeding and pain with defecation with internal hernia rubber band ligation while in clinic.     Plan:   - follow-up prn in clinic

## 2018-08-26 ENCOUNTER — PATIENT MESSAGE (OUTPATIENT)
Dept: SURGERY | Facility: CLINIC | Age: 23
End: 2018-08-26

## 2018-10-04 ENCOUNTER — PATIENT MESSAGE (OUTPATIENT)
Dept: SURGERY | Facility: CLINIC | Age: 23
End: 2018-10-04

## 2018-10-18 ENCOUNTER — PATIENT MESSAGE (OUTPATIENT)
Dept: SURGERY | Facility: CLINIC | Age: 23
End: 2018-10-18

## 2021-03-26 ENCOUNTER — IMMUNIZATION (OUTPATIENT)
Dept: PRIMARY CARE CLINIC | Facility: CLINIC | Age: 26
End: 2021-03-26
Payer: COMMERCIAL

## 2021-03-26 DIAGNOSIS — Z23 NEED FOR VACCINATION: Primary | ICD-10-CM

## 2021-03-26 PROCEDURE — 0001A COVID-19, MRNA, LNP-S, PF, 30 MCG/0.3 ML DOSE VACCINE: ICD-10-PCS | Mod: CV19,S$GLB,, | Performed by: FAMILY MEDICINE

## 2021-03-26 PROCEDURE — 91300 COVID-19, MRNA, LNP-S, PF, 30 MCG/0.3 ML DOSE VACCINE: CPT | Mod: S$GLB,,, | Performed by: FAMILY MEDICINE

## 2021-03-26 PROCEDURE — 0001A COVID-19, MRNA, LNP-S, PF, 30 MCG/0.3 ML DOSE VACCINE: CPT | Mod: CV19,S$GLB,, | Performed by: FAMILY MEDICINE

## 2021-03-26 PROCEDURE — 91300 COVID-19, MRNA, LNP-S, PF, 30 MCG/0.3 ML DOSE VACCINE: ICD-10-PCS | Mod: S$GLB,,, | Performed by: FAMILY MEDICINE

## 2021-04-28 ENCOUNTER — IMMUNIZATION (OUTPATIENT)
Dept: INTERNAL MEDICINE | Facility: CLINIC | Age: 26
End: 2021-04-28
Payer: COMMERCIAL

## 2021-04-28 DIAGNOSIS — Z23 NEED FOR VACCINATION: Primary | ICD-10-CM

## 2021-04-28 PROCEDURE — 91300 COVID-19, MRNA, LNP-S, PF, 30 MCG/0.3 ML DOSE VACCINE: CPT | Mod: ,,, | Performed by: FAMILY MEDICINE

## 2021-04-28 PROCEDURE — 91300 COVID-19, MRNA, LNP-S, PF, 30 MCG/0.3 ML DOSE VACCINE: ICD-10-PCS | Mod: ,,, | Performed by: FAMILY MEDICINE

## 2021-04-28 PROCEDURE — 0002A COVID-19, MRNA, LNP-S, PF, 30 MCG/0.3 ML DOSE VACCINE: ICD-10-PCS | Mod: CV19,,, | Performed by: FAMILY MEDICINE

## 2021-04-28 PROCEDURE — 0002A COVID-19, MRNA, LNP-S, PF, 30 MCG/0.3 ML DOSE VACCINE: CPT | Mod: CV19,,, | Performed by: FAMILY MEDICINE

## 2021-12-30 ENCOUNTER — IMMUNIZATION (OUTPATIENT)
Dept: PRIMARY CARE CLINIC | Facility: CLINIC | Age: 26
End: 2021-12-30
Payer: COMMERCIAL

## 2021-12-30 DIAGNOSIS — Z23 NEED FOR VACCINATION: Primary | ICD-10-CM

## 2021-12-30 PROCEDURE — 0004A COVID-19, MRNA, LNP-S, PF, 30 MCG/0.3 ML DOSE VACCINE: CPT | Mod: CV19,PBBFAC | Performed by: FAMILY MEDICINE

## 2021-12-31 ENCOUNTER — PATIENT MESSAGE (OUTPATIENT)
Dept: ADMINISTRATIVE | Facility: OTHER | Age: 26
End: 2021-12-31
Payer: COMMERCIAL

## 2021-12-31 ENCOUNTER — LAB VISIT (OUTPATIENT)
Dept: PRIMARY CARE CLINIC | Facility: OTHER | Age: 26
End: 2021-12-31
Attending: INTERNAL MEDICINE
Payer: COMMERCIAL

## 2021-12-31 DIAGNOSIS — J02.9 SORE THROAT: ICD-10-CM

## 2021-12-31 DIAGNOSIS — Z11.52 ENCOUNTER FOR SCREENING FOR COVID-19: Primary | ICD-10-CM

## 2021-12-31 PROCEDURE — U0003 INFECTIOUS AGENT DETECTION BY NUCLEIC ACID (DNA OR RNA); SEVERE ACUTE RESPIRATORY SYNDROME CORONAVIRUS 2 (SARS-COV-2) (CORONAVIRUS DISEASE [COVID-19]), AMPLIFIED PROBE TECHNIQUE, MAKING USE OF HIGH THROUGHPUT TECHNOLOGIES AS DESCRIBED BY CMS-2020-01-R: HCPCS | Performed by: INTERNAL MEDICINE

## 2022-01-02 LAB — SARS-COV-2 RNA RESP QL NAA+PROBE: DETECTED

## 2022-01-07 ENCOUNTER — LAB VISIT (OUTPATIENT)
Dept: PRIMARY CARE CLINIC | Facility: OTHER | Age: 27
End: 2022-01-07
Attending: INTERNAL MEDICINE
Payer: COMMERCIAL

## 2022-01-07 ENCOUNTER — PATIENT MESSAGE (OUTPATIENT)
Dept: ADMINISTRATIVE | Facility: OTHER | Age: 27
End: 2022-01-07
Payer: COMMERCIAL

## 2022-01-07 DIAGNOSIS — Z20.822 ENCOUNTER FOR LABORATORY TESTING FOR COVID-19 VIRUS: ICD-10-CM

## 2022-01-07 PROCEDURE — U0003 INFECTIOUS AGENT DETECTION BY NUCLEIC ACID (DNA OR RNA); SEVERE ACUTE RESPIRATORY SYNDROME CORONAVIRUS 2 (SARS-COV-2) (CORONAVIRUS DISEASE [COVID-19]), AMPLIFIED PROBE TECHNIQUE, MAKING USE OF HIGH THROUGHPUT TECHNOLOGIES AS DESCRIBED BY CMS-2020-01-R: HCPCS | Performed by: INTERNAL MEDICINE

## 2022-01-10 LAB
SARS-COV-2 RNA RESP QL NAA+PROBE: DETECTED
SARS-COV-2- CYCLE NUMBER: 37

## 2022-03-02 ENCOUNTER — PATIENT MESSAGE (OUTPATIENT)
Dept: RESEARCH | Facility: HOSPITAL | Age: 27
End: 2022-03-02
Payer: COMMERCIAL
